# Patient Record
Sex: MALE | Race: BLACK OR AFRICAN AMERICAN | NOT HISPANIC OR LATINO | URBAN - METROPOLITAN AREA
[De-identification: names, ages, dates, MRNs, and addresses within clinical notes are randomized per-mention and may not be internally consistent; named-entity substitution may affect disease eponyms.]

---

## 2021-09-11 ENCOUNTER — INPATIENT (INPATIENT)
Facility: HOSPITAL | Age: 41
LOS: 3 days | Discharge: HOME | End: 2021-09-15
Attending: GENERAL ACUTE CARE HOSPITAL | Admitting: GENERAL ACUTE CARE HOSPITAL
Payer: MEDICAID

## 2021-09-11 VITALS
RESPIRATION RATE: 16 BRPM | SYSTOLIC BLOOD PRESSURE: 138 MMHG | HEART RATE: 88 BPM | TEMPERATURE: 99 F | DIASTOLIC BLOOD PRESSURE: 92 MMHG | OXYGEN SATURATION: 98 % | HEIGHT: 73 IN | WEIGHT: 194.01 LBS

## 2021-09-11 LAB
ACETONE SERPL-MCNC: ABNORMAL
ALBUMIN SERPL ELPH-MCNC: 4.2 G/DL — SIGNIFICANT CHANGE UP (ref 3.3–5)
ALP SERPL-CCNC: 85 U/L — SIGNIFICANT CHANGE UP (ref 40–120)
ALT FLD-CCNC: 20 U/L — SIGNIFICANT CHANGE UP (ref 12–78)
ANION GAP SERPL CALC-SCNC: 13 MMOL/L — SIGNIFICANT CHANGE UP (ref 5–17)
AST SERPL-CCNC: 18 U/L — SIGNIFICANT CHANGE UP (ref 15–37)
BASOPHILS # BLD AUTO: 0.04 K/UL — SIGNIFICANT CHANGE UP (ref 0–0.2)
BASOPHILS NFR BLD AUTO: 0.3 % — SIGNIFICANT CHANGE UP (ref 0–2)
BILIRUB SERPL-MCNC: 1.5 MG/DL — HIGH (ref 0.2–1.2)
BUN SERPL-MCNC: 28 MG/DL — HIGH (ref 7–23)
CALCIUM SERPL-MCNC: 10 MG/DL — SIGNIFICANT CHANGE UP (ref 8.5–10.1)
CHLORIDE SERPL-SCNC: 100 MMOL/L — SIGNIFICANT CHANGE UP (ref 96–108)
CO2 SERPL-SCNC: 21 MMOL/L — LOW (ref 22–31)
CREAT SERPL-MCNC: 1.55 MG/DL — HIGH (ref 0.5–1.3)
EOSINOPHIL # BLD AUTO: 0 K/UL — SIGNIFICANT CHANGE UP (ref 0–0.5)
EOSINOPHIL NFR BLD AUTO: 0 % — SIGNIFICANT CHANGE UP (ref 0–6)
GLUCOSE BLDC GLUCOMTR-MCNC: 154 MG/DL — HIGH (ref 70–99)
GLUCOSE BLDC GLUCOMTR-MCNC: 255 MG/DL — HIGH (ref 70–99)
GLUCOSE SERPL-MCNC: 229 MG/DL — HIGH (ref 70–99)
HCT VFR BLD CALC: 44.6 % — SIGNIFICANT CHANGE UP (ref 39–50)
HGB BLD-MCNC: 15.7 G/DL — SIGNIFICANT CHANGE UP (ref 13–17)
IMM GRANULOCYTES NFR BLD AUTO: 0.3 % — SIGNIFICANT CHANGE UP (ref 0–1.5)
LIDOCAIN IGE QN: 61 U/L — LOW (ref 73–393)
LYMPHOCYTES # BLD AUTO: 1.46 K/UL — SIGNIFICANT CHANGE UP (ref 1–3.3)
LYMPHOCYTES # BLD AUTO: 12.1 % — LOW (ref 13–44)
MCHC RBC-ENTMCNC: 29.3 PG — SIGNIFICANT CHANGE UP (ref 27–34)
MCHC RBC-ENTMCNC: 35.2 GM/DL — SIGNIFICANT CHANGE UP (ref 32–36)
MCV RBC AUTO: 83.2 FL — SIGNIFICANT CHANGE UP (ref 80–100)
MONOCYTES # BLD AUTO: 0.97 K/UL — HIGH (ref 0–0.9)
MONOCYTES NFR BLD AUTO: 8 % — SIGNIFICANT CHANGE UP (ref 2–14)
NEUTROPHILS # BLD AUTO: 9.56 K/UL — HIGH (ref 1.8–7.4)
NEUTROPHILS NFR BLD AUTO: 79.3 % — HIGH (ref 43–77)
NRBC # BLD: 0 /100 WBCS — SIGNIFICANT CHANGE UP (ref 0–0)
PLATELET # BLD AUTO: 215 K/UL — SIGNIFICANT CHANGE UP (ref 150–400)
POTASSIUM SERPL-MCNC: 4.1 MMOL/L — SIGNIFICANT CHANGE UP (ref 3.5–5.3)
POTASSIUM SERPL-SCNC: 4.1 MMOL/L — SIGNIFICANT CHANGE UP (ref 3.5–5.3)
PROT SERPL-MCNC: 8.8 GM/DL — HIGH (ref 6–8.3)
RBC # BLD: 5.36 M/UL — SIGNIFICANT CHANGE UP (ref 4.2–5.8)
RBC # FLD: 13.3 % — SIGNIFICANT CHANGE UP (ref 10.3–14.5)
SODIUM SERPL-SCNC: 134 MMOL/L — LOW (ref 135–145)
WBC # BLD: 12.07 K/UL — HIGH (ref 3.8–10.5)
WBC # FLD AUTO: 12.07 K/UL — HIGH (ref 3.8–10.5)

## 2021-09-11 PROCEDURE — 93010 ELECTROCARDIOGRAM REPORT: CPT

## 2021-09-11 PROCEDURE — 99285 EMERGENCY DEPT VISIT HI MDM: CPT

## 2021-09-11 PROCEDURE — 99223 1ST HOSP IP/OBS HIGH 75: CPT

## 2021-09-11 PROCEDURE — 74177 CT ABD & PELVIS W/CONTRAST: CPT | Mod: 26,MH

## 2021-09-11 RX ORDER — SUCRALFATE 1 G
1 TABLET ORAL
Qty: 56 | Refills: 0
Start: 2021-09-11 | End: 2021-09-24

## 2021-09-11 RX ORDER — ONDANSETRON 8 MG/1
4 TABLET, FILM COATED ORAL EVERY 6 HOURS
Refills: 0 | Status: DISCONTINUED | OUTPATIENT
Start: 2021-09-11 | End: 2021-09-15

## 2021-09-11 RX ORDER — LIDOCAINE 4 G/100G
10 CREAM TOPICAL ONCE
Refills: 0 | Status: COMPLETED | OUTPATIENT
Start: 2021-09-11 | End: 2021-09-11

## 2021-09-11 RX ORDER — ONDANSETRON 8 MG/1
8 TABLET, FILM COATED ORAL ONCE
Refills: 0 | Status: COMPLETED | OUTPATIENT
Start: 2021-09-11 | End: 2021-09-11

## 2021-09-11 RX ORDER — MORPHINE SULFATE 50 MG/1
4 CAPSULE, EXTENDED RELEASE ORAL ONCE
Refills: 0 | Status: DISCONTINUED | OUTPATIENT
Start: 2021-09-11 | End: 2021-09-11

## 2021-09-11 RX ORDER — SUCRALFATE 1 G
1 TABLET ORAL ONCE
Refills: 0 | Status: DISCONTINUED | OUTPATIENT
Start: 2021-09-11 | End: 2021-09-11

## 2021-09-11 RX ORDER — SODIUM CHLORIDE 9 MG/ML
2000 INJECTION INTRAMUSCULAR; INTRAVENOUS; SUBCUTANEOUS ONCE
Refills: 0 | Status: COMPLETED | OUTPATIENT
Start: 2021-09-11 | End: 2021-09-11

## 2021-09-11 RX ORDER — MORPHINE SULFATE 50 MG/1
2 CAPSULE, EXTENDED RELEASE ORAL ONCE
Refills: 0 | Status: DISCONTINUED | OUTPATIENT
Start: 2021-09-11 | End: 2021-09-11

## 2021-09-11 RX ORDER — FAMOTIDINE 10 MG/ML
1 INJECTION INTRAVENOUS
Qty: 28 | Refills: 0
Start: 2021-09-11 | End: 2021-09-24

## 2021-09-11 RX ORDER — METOCLOPRAMIDE HCL 10 MG
1 TABLET ORAL
Qty: 56 | Refills: 0
Start: 2021-09-11 | End: 2021-09-24

## 2021-09-11 RX ORDER — FAMOTIDINE 10 MG/ML
20 INJECTION INTRAVENOUS ONCE
Refills: 0 | Status: COMPLETED | OUTPATIENT
Start: 2021-09-11 | End: 2021-09-11

## 2021-09-11 RX ORDER — SODIUM CHLORIDE 9 MG/ML
1000 INJECTION, SOLUTION INTRAVENOUS
Refills: 0 | Status: DISCONTINUED | OUTPATIENT
Start: 2021-09-11 | End: 2021-09-14

## 2021-09-11 RX ORDER — METOCLOPRAMIDE HCL 10 MG
10 TABLET ORAL THREE TIMES A DAY
Refills: 0 | Status: DISCONTINUED | OUTPATIENT
Start: 2021-09-11 | End: 2021-09-13

## 2021-09-11 RX ADMIN — MORPHINE SULFATE 2 MILLIGRAM(S): 50 CAPSULE, EXTENDED RELEASE ORAL at 23:52

## 2021-09-11 RX ADMIN — ONDANSETRON 8 MILLIGRAM(S): 8 TABLET, FILM COATED ORAL at 09:37

## 2021-09-11 RX ADMIN — MORPHINE SULFATE 4 MILLIGRAM(S): 50 CAPSULE, EXTENDED RELEASE ORAL at 17:51

## 2021-09-11 RX ADMIN — MORPHINE SULFATE 2 MILLIGRAM(S): 50 CAPSULE, EXTENDED RELEASE ORAL at 22:34

## 2021-09-11 RX ADMIN — ONDANSETRON 8 MILLIGRAM(S): 8 TABLET, FILM COATED ORAL at 17:56

## 2021-09-11 RX ADMIN — MORPHINE SULFATE 4 MILLIGRAM(S): 50 CAPSULE, EXTENDED RELEASE ORAL at 11:39

## 2021-09-11 RX ADMIN — SODIUM CHLORIDE 2000 MILLILITER(S): 9 INJECTION INTRAMUSCULAR; INTRAVENOUS; SUBCUTANEOUS at 09:37

## 2021-09-11 RX ADMIN — LIDOCAINE 10 MILLILITER(S): 4 CREAM TOPICAL at 19:29

## 2021-09-11 RX ADMIN — FAMOTIDINE 20 MILLIGRAM(S): 10 INJECTION INTRAVENOUS at 09:37

## 2021-09-11 RX ADMIN — Medication 30 MILLILITER(S): at 09:37

## 2021-09-11 RX ADMIN — Medication 10 MILLIGRAM(S): at 22:09

## 2021-09-11 RX ADMIN — SODIUM CHLORIDE 125 MILLILITER(S): 9 INJECTION, SOLUTION INTRAVENOUS at 19:41

## 2021-09-11 NOTE — H&P ADULT - NSHPLABSRESULTS_GEN_ALL_CORE
LABS:                        15.7   12.07 )-----------( 215      ( 11 Sep 2021 09:38 )             44.6     09-11    134<L>  |  100  |  28<H>  ----------------------------<  229<H>  4.1   |  21<L>  |  1.55<H>    Ca    10.0      11 Sep 2021 09:38    TPro  8.8<H>  /  Alb  4.2  /  TBili  1.5<H>  /  DBili  x   /  AST  18  /  ALT  20  /  AlkPhos  85  09-11            RADIOLOGY & ADDITIONAL TESTS:

## 2021-09-11 NOTE — ED ADULT NURSE NOTE - NSIMPLEMENTINTERV_GEN_ALL_ED
Implemented All Fall with Harm Risk Interventions:  [place] to call system. Call bell, personal items and telephone within reach. Instruct patient to call for assistance. Room bathroom lighting operational. Non-slip footwear when patient is off stretcher. Physically safe environment: no spills, clutter or unnecessary equipment. Stretcher in lowest position, wheels locked, appropriate side rails in place. Provide visual cue, wrist band, yellow gown, etc. Monitor gait and stability. Monitor for mental status changes and reorient to person, place, and time. Review medications for side effects contributing to fall risk. Reinforce activity limits and safety measures with patient and family. Provide visual clues: red socks.

## 2021-09-11 NOTE — ED PROVIDER NOTE - CLINICAL SUMMARY MEDICAL DECISION MAKING FREE TEXT BOX
Pt well appearing, sleeping comfortably, tolerated oral intake. dc with meds. Pt well appearing, sleeping comfortably, tolerated oral intake. dc with meds.    pt was to be dc, however started to vomit after dc. will admit or intractable vomiting and pain. likely gastroparesiss/gastritis

## 2021-09-11 NOTE — ED PROVIDER NOTE - PHYSICAL EXAMINATION
Gen: Alert, Well appearing. NAD  , + dry retching  Head: NC, AT, PERRL, normal lids/conjunctiva   ENT: patent oropharynx without erythema/exudate, uvula midline  Neck: supple, no tenderness/meningismus  Pulm: Bilateral clear BS, normal resp effort  CV: RRR, no M/R/G, +dist pulses   Abd: soft, + epigastric tender, ND, +BS, no guarding/rebound tenderness  Mskel: no edema/erythema/cyanosis   Skin: no rash, no bruising  Neuro: AAOx3, no sensory/motor deficits, CN 2-12 intact

## 2021-09-11 NOTE — ED PROVIDER NOTE - CARE PLAN
Principal Discharge DX:	Gastritis   1 Principal Discharge DX:	Gastritis  Secondary Diagnosis:	Gastroparesis  Secondary Diagnosis:	Intractable abdominal pain

## 2021-09-11 NOTE — H&P ADULT - NSHPPHYSICALEXAM_GEN_ALL_CORE
PHYSICAL EXAMINATION:  Vital Signs Last 24 Hrs  T(C): 37.1 (11 Sep 2021 08:22), Max: 37.1 (11 Sep 2021 08:22)  T(F): 98.7 (11 Sep 2021 08:22), Max: 98.7 (11 Sep 2021 08:22)  HR: 88 (11 Sep 2021 08:22) (88 - 88)  BP: 138/92 (11 Sep 2021 08:22) (138/92 - 138/92)  BP(mean): --  RR: 16 (11 Sep 2021 08:22) (16 - 16)  SpO2: 98% (11 Sep 2021 08:22) (98% - 98%)  CAPILLARY BLOOD GLUCOSE      POCT Blood Glucose.: 255 mg/dL (11 Sep 2021 08:25)      GENERAL: NAD, well-groomed, well-developed  HEAD:  atraumatic, normocephalic  EYES: sclera anicteric  ENMT: mucous membranes moist  NECK: supple, No JVD  CHEST/LUNG: clear to auscultation bilaterally; no rales, rhonchi, or wheezing b/l  HEART: normal S1, S2  ABDOMEN: BS+, soft, ND, NT   EXTREMITIES:  pulses palpable; no clubbing, cyanosis, or edema b/l LEs  NEURO: awake, alert, interactive; moves all extremities  SKIN: no rashes or lesions PHYSICAL EXAMINATION:  Vital Signs Last 24 Hrs  T(C): 37.1 (11 Sep 2021 08:22), Max: 37.1 (11 Sep 2021 08:22)  T(F): 98.7 (11 Sep 2021 08:22), Max: 98.7 (11 Sep 2021 08:22)  HR: 88 (11 Sep 2021 08:22) (88 - 88)  BP: 138/92 (11 Sep 2021 08:22) (138/92 - 138/92)  BP(mean): --  RR: 16 (11 Sep 2021 08:22) (16 - 16)  SpO2: 98% (11 Sep 2021 08:22) (98% - 98%)  CAPILLARY BLOOD GLUCOSE      POCT Blood Glucose.: 255 mg/dL (11 Sep 2021 08:25)      GENERAL: NAD, seen in ER, comfortable, no fevers or SOB, no abdominal pain  HEAD:  atraumatic, normocephalic  EYES: sclera anicteric  ENMT: mucous membranes moist  NECK: supple, No JVD  CHEST/LUNG: clear to auscultation bilaterally; no rales, rhonchi, or wheezing b/l  HEART: normal S1, S2  ABDOMEN: BS+, soft, ND, NT   EXTREMITIES:  pulses palpable; no clubbing, cyanosis, or edema b/l LEs  NEURO: awake, alert, interactive; moves all extremities  SKIN: no rashes or lesions

## 2021-09-11 NOTE — ED ADULT NURSE NOTE - CHIEF COMPLAINT QUOTE
pt is having abdominal ,  epigastric and  vomiting for two days and getting worse  . history of DM, asthma .

## 2021-09-11 NOTE — ED ADULT NURSE NOTE - OBJECTIVE STATEMENT
Patient received in bed 15, A&Ox4, diaphoretic and anxious. PMHx DM.  Pt c/o generalized abdominal pain since yesterday. States prior endoscopy with unknown results. Denies being on medications for DM. Admits to marijuana usage. Denies chest pain, sob and headaches.

## 2021-09-11 NOTE — H&P ADULT - ASSESSMENT
42 yo male PMH DM presents with epigastric pain and N/V since yesterday. Pt reports this happens to him about every 90 days and had endoscopy but does not know what they found. Last EGD was done one year ago,not sure of Doctor's name. Not on any meds except for DM.  Admits to marijuana use, but denies daily use. CT abdomen entirely normal.     Plan: Admit to medicine, IVF LR at 125/h, IV Zofran prn and IV Reglan every 8 hours. Advance diet as tolerated. Urine tox pending.   Need to r/o THC as cause. Consider EGD Monday with GI if symptoms persist.     DM: Hold Metformen in hospital. A1C in AM.    40 yo male PMH DM presents with epigastric pain and N/V since yesterday. Pt reports this happens to him about every 90 days and had endoscopy but does not know what they found. Last EGD was done one year ago,not sure of Doctor's name. Not on any meds except for DM.  Admits to marijuana use, but denies daily use. CT abdomen entirely normal.     Plan: Admit to medicine, IVF LR at 125/h, IV Zofran prn and IV Reglan every 8 hours. Advance diet as tolerated. Urine tox pending.   Need to r/o THC as cause vs diabetic gastroporesis. Consider EGD Monday with GI if symptoms persist.     DM: Hold Metformen in hospital. A1C in AM.

## 2021-09-11 NOTE — ED ADULT NURSE NOTE - ED STAT RN HANDOFF DETAILS
Report endorsed to oncrichard Banks ED RN. Safety checks compld this shift/Safety rounds completed hourly.  IV sites checked Q2+remains WDL. Meds given as ord with no s/s of adverse RXNs. Fall +skin precs in place. Any issues endorsed to oncrichard RN for follow up.

## 2021-09-11 NOTE — ED PROVIDER NOTE - PATIENT PORTAL LINK FT
You can access the FollowMyHealth Patient Portal offered by [facility] by registering at the following website: [email or web address]. By joining [employer]’s FollowMyHealth portal, you will also be able to view your health information using other applications (apps) compatible with our system.

## 2021-09-12 LAB
A1C WITH ESTIMATED AVERAGE GLUCOSE RESULT: 11.3 % — HIGH (ref 4–5.6)
AMPHET UR-MCNC: NEGATIVE — SIGNIFICANT CHANGE UP
ANION GAP SERPL CALC-SCNC: 5 MMOL/L — SIGNIFICANT CHANGE UP (ref 5–17)
APPEARANCE UR: CLEAR — SIGNIFICANT CHANGE UP
BACTERIA # UR AUTO: ABNORMAL
BARBITURATES UR SCN-MCNC: NEGATIVE — SIGNIFICANT CHANGE UP
BENZODIAZ UR-MCNC: NEGATIVE — SIGNIFICANT CHANGE UP
BILIRUB UR-MCNC: NEGATIVE — SIGNIFICANT CHANGE UP
BUN SERPL-MCNC: 25 MG/DL — HIGH (ref 7–23)
CALCIUM SERPL-MCNC: 8.9 MG/DL — SIGNIFICANT CHANGE UP (ref 8.5–10.1)
CHLORIDE SERPL-SCNC: 101 MMOL/L — SIGNIFICANT CHANGE UP (ref 96–108)
CO2 SERPL-SCNC: 27 MMOL/L — SIGNIFICANT CHANGE UP (ref 22–31)
COCAINE METAB.OTHER UR-MCNC: NEGATIVE — SIGNIFICANT CHANGE UP
COLOR SPEC: YELLOW — SIGNIFICANT CHANGE UP
COVID-19 SPIKE DOMAIN AB INTERP: NEGATIVE — SIGNIFICANT CHANGE UP
COVID-19 SPIKE DOMAIN ANTIBODY RESULT: 0.4 U/ML — SIGNIFICANT CHANGE UP
CREAT SERPL-MCNC: 1.27 MG/DL — SIGNIFICANT CHANGE UP (ref 0.5–1.3)
DIFF PNL FLD: ABNORMAL
ESTIMATED AVERAGE GLUCOSE: 278 MG/DL — HIGH (ref 68–114)
FLUAV AG NPH QL: SIGNIFICANT CHANGE UP
FLUBV AG NPH QL: SIGNIFICANT CHANGE UP
GLUCOSE BLDC GLUCOMTR-MCNC: 140 MG/DL — HIGH (ref 70–99)
GLUCOSE BLDC GLUCOMTR-MCNC: 152 MG/DL — HIGH (ref 70–99)
GLUCOSE BLDC GLUCOMTR-MCNC: 168 MG/DL — HIGH (ref 70–99)
GLUCOSE BLDC GLUCOMTR-MCNC: 197 MG/DL — HIGH (ref 70–99)
GLUCOSE SERPL-MCNC: 165 MG/DL — HIGH (ref 70–99)
GLUCOSE UR QL: 250 MG/DL
HCT VFR BLD CALC: 43.5 % — SIGNIFICANT CHANGE UP (ref 39–50)
HGB BLD-MCNC: 14.4 G/DL — SIGNIFICANT CHANGE UP (ref 13–17)
KETONES UR-MCNC: ABNORMAL
LEUKOCYTE ESTERASE UR-ACNC: NEGATIVE — SIGNIFICANT CHANGE UP
MCHC RBC-ENTMCNC: 28.7 PG — SIGNIFICANT CHANGE UP (ref 27–34)
MCHC RBC-ENTMCNC: 33.1 GM/DL — SIGNIFICANT CHANGE UP (ref 32–36)
MCV RBC AUTO: 86.7 FL — SIGNIFICANT CHANGE UP (ref 80–100)
METHADONE UR-MCNC: NEGATIVE — SIGNIFICANT CHANGE UP
NITRITE UR-MCNC: NEGATIVE — SIGNIFICANT CHANGE UP
NRBC # BLD: 0 /100 WBCS — SIGNIFICANT CHANGE UP (ref 0–0)
OPIATES UR-MCNC: POSITIVE — SIGNIFICANT CHANGE UP
PCP SPEC-MCNC: SIGNIFICANT CHANGE UP
PCP UR-MCNC: NEGATIVE — SIGNIFICANT CHANGE UP
PH UR: 5 — SIGNIFICANT CHANGE UP (ref 5–8)
PLATELET # BLD AUTO: 179 K/UL — SIGNIFICANT CHANGE UP (ref 150–400)
POTASSIUM SERPL-MCNC: 4.2 MMOL/L — SIGNIFICANT CHANGE UP (ref 3.5–5.3)
POTASSIUM SERPL-SCNC: 4.2 MMOL/L — SIGNIFICANT CHANGE UP (ref 3.5–5.3)
PROT UR-MCNC: 100 MG/DL
RBC # BLD: 5.02 M/UL — SIGNIFICANT CHANGE UP (ref 4.2–5.8)
RBC # FLD: 13.5 % — SIGNIFICANT CHANGE UP (ref 10.3–14.5)
RBC CASTS # UR COMP ASSIST: ABNORMAL /HPF (ref 0–4)
SARS-COV-2 IGG+IGM SERPL QL IA: 0.4 U/ML — SIGNIFICANT CHANGE UP
SARS-COV-2 IGG+IGM SERPL QL IA: NEGATIVE — SIGNIFICANT CHANGE UP
SARS-COV-2 RNA SPEC QL NAA+PROBE: SIGNIFICANT CHANGE UP
SODIUM SERPL-SCNC: 133 MMOL/L — LOW (ref 135–145)
SP GR SPEC: 1.02 — SIGNIFICANT CHANGE UP (ref 1.01–1.02)
THC UR QL: POSITIVE — SIGNIFICANT CHANGE UP
UROBILINOGEN FLD QL: NEGATIVE MG/DL — SIGNIFICANT CHANGE UP
WBC # BLD: 9.17 K/UL — SIGNIFICANT CHANGE UP (ref 3.8–10.5)
WBC # FLD AUTO: 9.17 K/UL — SIGNIFICANT CHANGE UP (ref 3.8–10.5)
WBC UR QL: SIGNIFICANT CHANGE UP

## 2021-09-12 PROCEDURE — 99232 SBSQ HOSP IP/OBS MODERATE 35: CPT

## 2021-09-12 RX ORDER — PANTOPRAZOLE SODIUM 20 MG/1
40 TABLET, DELAYED RELEASE ORAL
Refills: 0 | Status: DISCONTINUED | OUTPATIENT
Start: 2021-09-12 | End: 2021-09-15

## 2021-09-12 RX ORDER — MORPHINE SULFATE 50 MG/1
2 CAPSULE, EXTENDED RELEASE ORAL EVERY 4 HOURS
Refills: 0 | Status: DISCONTINUED | OUTPATIENT
Start: 2021-09-12 | End: 2021-09-12

## 2021-09-12 RX ORDER — OXYCODONE HYDROCHLORIDE 5 MG/1
5 TABLET ORAL ONCE
Refills: 0 | Status: DISCONTINUED | OUTPATIENT
Start: 2021-09-12 | End: 2021-09-12

## 2021-09-12 RX ORDER — MORPHINE SULFATE 50 MG/1
2 CAPSULE, EXTENDED RELEASE ORAL EVERY 4 HOURS
Refills: 0 | Status: DISCONTINUED | OUTPATIENT
Start: 2021-09-12 | End: 2021-09-15

## 2021-09-12 RX ADMIN — MORPHINE SULFATE 2 MILLIGRAM(S): 50 CAPSULE, EXTENDED RELEASE ORAL at 19:48

## 2021-09-12 RX ADMIN — OXYCODONE HYDROCHLORIDE 5 MILLIGRAM(S): 5 TABLET ORAL at 03:29

## 2021-09-12 RX ADMIN — MORPHINE SULFATE 2 MILLIGRAM(S): 50 CAPSULE, EXTENDED RELEASE ORAL at 07:00

## 2021-09-12 RX ADMIN — OXYCODONE HYDROCHLORIDE 5 MILLIGRAM(S): 5 TABLET ORAL at 03:59

## 2021-09-12 RX ADMIN — MORPHINE SULFATE 2 MILLIGRAM(S): 50 CAPSULE, EXTENDED RELEASE ORAL at 05:43

## 2021-09-12 RX ADMIN — MORPHINE SULFATE 2 MILLIGRAM(S): 50 CAPSULE, EXTENDED RELEASE ORAL at 20:03

## 2021-09-12 RX ADMIN — Medication 10 MILLIGRAM(S): at 14:23

## 2021-09-12 RX ADMIN — MORPHINE SULFATE 2 MILLIGRAM(S): 50 CAPSULE, EXTENDED RELEASE ORAL at 10:45

## 2021-09-12 RX ADMIN — Medication 10 MILLIGRAM(S): at 05:43

## 2021-09-12 RX ADMIN — MORPHINE SULFATE 2 MILLIGRAM(S): 50 CAPSULE, EXTENDED RELEASE ORAL at 00:44

## 2021-09-12 RX ADMIN — MORPHINE SULFATE 2 MILLIGRAM(S): 50 CAPSULE, EXTENDED RELEASE ORAL at 01:34

## 2021-09-12 RX ADMIN — MORPHINE SULFATE 2 MILLIGRAM(S): 50 CAPSULE, EXTENDED RELEASE ORAL at 10:27

## 2021-09-12 NOTE — PROGRESS NOTE ADULT - SUBJECTIVE AND OBJECTIVE BOX
Patient is a 41y old  Male who presents with a chief complaint of Nausea and vomiting (11 Sep 2021 18:44)    INTERVAL HPI/OVERNIGHT EVENTS: no events, still w pain     MEDICATIONS  (STANDING):  lactated ringers. 1000 milliLiter(s) (125 mL/Hr) IV Continuous <Continuous>  metoclopramide Injectable 10 milliGRAM(s) IV Push three times a day  pantoprazole    Tablet 40 milliGRAM(s) Oral before breakfast    MEDICATIONS  (PRN):  morphine  - Injectable 2 milliGRAM(s) IV Push every 4 hours PRN Moderate Pain (4 - 6)  ondansetron Injectable 4 milliGRAM(s) IV Push every 6 hours PRN Nausea and/or Vomiting    Allergies    No Known Allergies    Intolerances      REVIEW OF SYSTEMS:  All other systems reviewed and are negative    Vital Signs Last 24 Hrs  T(C): 37.3 (12 Sep 2021 11:48), Max: 37.3 (12 Sep 2021 11:48)  T(F): 99.2 (12 Sep 2021 11:48), Max: 99.2 (12 Sep 2021 11:48)  HR: 67 (12 Sep 2021 11:48) (61 - 76)  BP: 134/91 (12 Sep 2021 11:48) (134/91 - 154/93)  BP(mean): --  RR: 18 (12 Sep 2021 11:48) (16 - 18)  SpO2: 100% (12 Sep 2021 11:48) (99% - 100%)  Daily     Daily   I&O's Summary    CAPILLARY BLOOD GLUCOSE      POCT Blood Glucose.: 168 mg/dL (12 Sep 2021 10:54)  POCT Blood Glucose.: 140 mg/dL (12 Sep 2021 07:51)  POCT Blood Glucose.: 154 mg/dL (11 Sep 2021 22:38)    PHYSICAL EXAM:  GENERAL: NAD,    HEAD:  Atraumatic, Normocephalic  EYES: EOMI, PERRLA, conjunctiva and sclera clear  ENMT: No tonsillar erythema, exudates, or enlargement; Moist mucous membranes, Good dentition, No lesions  NECK: Supple, No JVD, Normal thyroid  NERVOUS SYSTEM:  Alert & Oriented X3, Good concentration; Motor Strength 5/5 B/L upper and lower extremities; DTRs 2+ intact and symmetric  CHEST/LUNG: Clear to percussion bilaterally; No rales, rhonchi, wheezing, or rubs  HEART: Regular rate and rhythm; No murmurs, rubs, or gallops  ABDOMEN: Soft, Nontender, Nondistended; Bowel sounds present  EXTREMITIES:  2+ Peripheral Pulses, No clubbing, cyanosis, or edema  LYMPH: No lymphadenopathy noted  SKIN: No rashes or lesions    Labs                          14.4   9.17  )-----------( 179      ( 12 Sep 2021 07:18 )             43.5     09-12    133<L>  |  101  |  25<H>  ----------------------------<  165<H>  4.2   |  27  |  1.27    Ca    8.9      12 Sep 2021 07:18    TPro  8.8<H>  /  Alb  4.2  /  TBili  1.5<H>  /  DBili  x   /  AST  18  /  ALT  20  /  AlkPhos  85  09-11          Urinalysis Basic - ( 12 Sep 2021 05:56 )    Color: Yellow / Appearance: Clear / S.020 / pH: x  Gluc: x / Ketone: Moderate  / Bili: Negative / Urobili: Negative mg/dL   Blood: x / Protein: 100 mg/dL / Nitrite: Negative   Leuk Esterase: Negative / RBC: 3-5 /HPF / WBC 0-2   Sq Epi: x / Non Sq Epi: x / Bacteria: Occasional                  DVT prophylaxis: > Lovenox 40mg SQ daily  > Heparin   > SCD's

## 2021-09-12 NOTE — PROGRESS NOTE ADULT - ASSESSMENT
40 yo male PMH DM presents with epigastric pain and N/V since yesterday. Pt reports this happens to him about every 90 days and had endoscopy but does not know what they found. Last EGD was done one year ago,not sure of Doctor's name. Not on any meds except for DM.  Admits to marijuana use, but denies daily use. CT abdomen entirely normal.     Plan: A , IVF LR at 125/h, IV Zofran prn and IV Reglan every 8 hours. Advance diet as tolerated.    Need to r/o THC as cause vs diabetic gastroporesis.  recent egd wnl, pt likely w gastroparesis 2/2 uncontrolled diabetes  Does not want to take insulin     DM: Hold Metformen in hospital. A1C in AM.

## 2021-09-13 LAB
CULTURE RESULTS: SIGNIFICANT CHANGE UP
GLUCOSE BLDC GLUCOMTR-MCNC: 158 MG/DL — HIGH (ref 70–99)
GLUCOSE BLDC GLUCOMTR-MCNC: 165 MG/DL — HIGH (ref 70–99)
GLUCOSE BLDC GLUCOMTR-MCNC: 196 MG/DL — HIGH (ref 70–99)
GLUCOSE BLDC GLUCOMTR-MCNC: 225 MG/DL — HIGH (ref 70–99)
SPECIMEN SOURCE: SIGNIFICANT CHANGE UP

## 2021-09-13 PROCEDURE — 99232 SBSQ HOSP IP/OBS MODERATE 35: CPT

## 2021-09-13 RX ORDER — METOCLOPRAMIDE HCL 10 MG
10 TABLET ORAL THREE TIMES A DAY
Refills: 0 | Status: DISCONTINUED | OUTPATIENT
Start: 2021-09-13 | End: 2021-09-14

## 2021-09-13 RX ADMIN — Medication 10 MILLIGRAM(S): at 13:32

## 2021-09-13 RX ADMIN — PANTOPRAZOLE SODIUM 40 MILLIGRAM(S): 20 TABLET, DELAYED RELEASE ORAL at 06:14

## 2021-09-13 RX ADMIN — MORPHINE SULFATE 2 MILLIGRAM(S): 50 CAPSULE, EXTENDED RELEASE ORAL at 06:29

## 2021-09-13 RX ADMIN — MORPHINE SULFATE 2 MILLIGRAM(S): 50 CAPSULE, EXTENDED RELEASE ORAL at 06:14

## 2021-09-13 RX ADMIN — MORPHINE SULFATE 2 MILLIGRAM(S): 50 CAPSULE, EXTENDED RELEASE ORAL at 01:08

## 2021-09-13 RX ADMIN — MORPHINE SULFATE 2 MILLIGRAM(S): 50 CAPSULE, EXTENDED RELEASE ORAL at 00:53

## 2021-09-13 NOTE — PROGRESS NOTE ADULT - ASSESSMENT
40 yo male PMH DM presents with epigastric pain and N/V since yesterday. Pt reports this happens to him about every 90 days and had endoscopy but does not know what they found. Last EGD was done one year ago,not sure of Doctor's name. Not on any meds except for DM.  Admits to marijuana use, but denies daily use. CT abdomen entirely normal.     Plan: A , IVF LR at 125/h, IV Zofran prn and IV Reglan every 8 hours. Advance diet as tolerated.    Need to r/o THC as cause vs diabetic gastroporesis.  recent egd wnl, pt likely w gastroparesis 2/2 uncontrolled diabetes  Still w abd pain, gi to see patient     Does not want to take insulin     DM: Hold Metmervat in hospital. A1C 11.2

## 2021-09-13 NOTE — CONSULT NOTE ADULT - ASSESSMENT
HPI:  40 yo male PMH DM presents with epigastric pain and N/V since yesterday. Pt reports this happens to him about every 90 days and had endoscopy but does not know what they found. Last EGD was done one year ago,not sure of Doctor's name. Not on any meds except for DM.  Admits to marijuana use, but denies daily use. CT abdomen entirely normal.  (11 Sep 2021 18:44)    --- As Above -----------------  Patient states that since 09/2020, he has episodes of abdominal pain described as crampy a/w N/V. He had 4 episodes necessitating admission prior to September. He admits taking marijuana but infrequently drinks or takes NSAIDs. HGB A1c better at 11 ( seel lab ) Patient states that he had a negative EGD in september.  Feels better today    N/V, abdominal pain- R/O gastroparesis, secondary to marijuana use, GB disease, etc. - 1) Clear liquid diet 2) IV fluid ( Patient refusing. States that the IV is dehydrating him and that bottle water works better. ( When I tried to explain to him the physiology of the IV, he became more belligerent ( smiling with mask on / don't believe him )) 3) HIDA with CCK ( Must be off Morphine and Reglan - Patient refusing to be off Morphine until tomorrow ) 4) will hold off on EGD for now

## 2021-09-13 NOTE — PROGRESS NOTE ADULT - SUBJECTIVE AND OBJECTIVE BOX
Patient is a 41y old  Male who presents with a chief complaint of Nausea and vomiting (12 Sep 2021 11:56)    INTERVAL HPI/OVERNIGHT EVENTS: no events     MEDICATIONS  (STANDING):  lactated ringers. 1000 milliLiter(s) (125 mL/Hr) IV Continuous <Continuous>  metoclopramide Injectable 10 milliGRAM(s) IV Push three times a day  pantoprazole    Tablet 40 milliGRAM(s) Oral before breakfast    MEDICATIONS  (PRN):  morphine  - Injectable 2 milliGRAM(s) IV Push every 4 hours PRN Moderate Pain (4 - 6)  ondansetron Injectable 4 milliGRAM(s) IV Push every 6 hours PRN Nausea and/or Vomiting    Allergies    No Known Allergies    Intolerances      REVIEW OF SYSTEMS:  All other systems reviewed and are negative    Vital Signs Last 24 Hrs  T(C): 36.7 (13 Sep 2021 11:36), Max: 36.8 (13 Sep 2021 06:10)  T(F): 98.1 (13 Sep 2021 11:36), Max: 98.3 (13 Sep 2021 06:10)  HR: 62 (13 Sep 2021 11:36) (61 - 62)  BP: 135/76 (13 Sep 2021 11:36) (126/70 - 148/92)  BP(mean): --  RR: 17 (13 Sep 2021 11:36) (17 - 18)  SpO2: 98% (13 Sep 2021 11:36) (98% - 100%)  Daily     Daily   I&O's Summary    12 Sep 2021 07:01  -  13 Sep 2021 07:00  --------------------------------------------------------  IN: 1730 mL / OUT: 0 mL / NET: 1730 mL      CAPILLARY BLOOD GLUCOSE      POCT Blood Glucose.: 158 mg/dL (13 Sep 2021 11:05)  POCT Blood Glucose.: 165 mg/dL (13 Sep 2021 07:25)  POCT Blood Glucose.: 197 mg/dL (12 Sep 2021 22:00)  POCT Blood Glucose.: 152 mg/dL (12 Sep 2021 15:48)    PHYSICAL EXAM:  GENERAL: NAD,    HEAD:  Atraumatic, Normocephalic  EYES: EOMI, PERRLA, conjunctiva and sclera clear  ENMT: No tonsillar erythema, exudates, or enlargement; Moist mucous membranes, Good dentition, No lesions  NECK: Supple, No JVD, Normal thyroid  NERVOUS SYSTEM:  Alert & Oriented X3, Good concentration; Motor Strength 5/5 B/L upper and lower extremities; DTRs 2+ intact and symmetric  CHEST/LUNG: Clear to percussion bilaterally; No rales, rhonchi, wheezing, or rubs  HEART: Regular rate and rhythm; No murmurs, rubs, or gallops  ABDOMEN: Soft, Nontender, Nondistended; Bowel sounds present  EXTREMITIES:  2+ Peripheral Pulses, No clubbing, cyanosis, or edema  LYMPH: No lymphadenopathy noted  SKIN: No rashes or lesions    Labs                          14.4   9.17  )-----------( 179      ( 12 Sep 2021 07:18 )             43.5     09-12    133<L>  |  101  |  25<H>  ----------------------------<  165<H>  4.2   |  27  |  1.27    Ca    8.9      12 Sep 2021 07:18            Urinalysis Basic - ( 12 Sep 2021 05:56 )    Color: Yellow / Appearance: Clear / S.020 / pH: x  Gluc: x / Ketone: Moderate  / Bili: Negative / Urobili: Negative mg/dL   Blood: x / Protein: 100 mg/dL / Nitrite: Negative   Leuk Esterase: Negative / RBC: 3-5 /HPF / WBC 0-2   Sq Epi: x / Non Sq Epi: x / Bacteria: Occasional        Culture - Urine (collected 12 Sep 2021 10:59)  Source: Clean Catch Clean Catch (Midstream)  Final Report (13 Sep 2021 10:09):    <10,000 CFU/mL Normal Urogenital Celina                DVT prophylaxis: > Lovenox 40mg SQ daily  > Heparin   > SCD's

## 2021-09-13 NOTE — CONSULT NOTE ADULT - SUBJECTIVE AND OBJECTIVE BOX
HPI:  42 yo male PMH DM presents with epigastric pain and N/V since yesterday. Pt reports this happens to him about every 90 days and had endoscopy but does not know what they found. Last EGD was done one year ago,not sure of Doctor's name. Not on any meds except for DM.  Admits to marijuana use, but denies daily use. CT abdomen entirely normal.  (11 Sep 2021 18:44)    ------ As Above -----------------  Patient states that since 2020, he has episodes of abdominal pain ( described as crampy ) a/w N/V. He had 4 episodes necessitating admission prior to 2021. He admits taking marijuana but infrequently drinks or takes NSAIDs. HGB A1c better  ( seel lab ) Patient states that he had a negative EGD in september.  Feels better today.      MEDICATIONS  (STANDING):  lactated ringers. 1000 milliLiter(s) (125 mL/Hr) IV Continuous <Continuous>  metoclopramide Injectable 10 milliGRAM(s) IV Push three times a day  pantoprazole    Tablet 40 milliGRAM(s) Oral before breakfast    MEDICATIONS  (PRN):  morphine  - Injectable 2 milliGRAM(s) IV Push every 4 hours PRN Moderate Pain (4 - 6)  ondansetron Injectable 4 milliGRAM(s) IV Push every 6 hours PRN Nausea and/or Vomiting      Allergies    No Known Allergies    Intolerances        FAMILY HISTORY:      REVIEW OF SYSTEMS:    CONSTITUTIONAL: No fever, weight loss,   EYES: No eye pain, visual disturbances, or discharge  ENMT:  No difficulty hearing, tinnitus, vertigo; No sinus or throat pain  NECK: No pain or stiffness  BREASTS: No pain, masses, or nipple discharge  RESPIRATORY: No cough, wheezing, chills or hemoptysis; No shortness of breath  CARDIOVASCULAR: No chest pain, palpitations, dizziness, or leg swelling  GASTROINTESTINAL: See above   GENITOURINARY: No dysuria, frequency, hematuria, or incontinence  NEUROLOGICAL: No headaches, memory loss, loss of strength, numbness, or tremors  SKIN: No itching, burning, rashes, or lesions   LYMPH NODES: No enlarged glands  ENDOCRINE: No heat or cold intolerance; No hair loss  MUSCULOSKELETAL: No joint pain or swelling; No muscle, back, or extremity pain  PSYCHIATRIC: No depression, anxiety, mood swings, or difficulty sleeping  HEME/LYMPH: No easy bruising, or bleeding gums  ALLERGY AND IMMUNOLOGIC: No hives or eczema          SOCIAL HISTORY:    FAMILY HISTORY:      Vital Signs Last 24 Hrs  T(C): 36.7 (13 Sep 2021 11:36), Max: 36.8 (13 Sep 2021 06:10)  T(F): 98.1 (13 Sep 2021 11:36), Max: 98.3 (13 Sep 2021 06:10)  HR: 62 (13 Sep 2021 11:36) (61 - 62)  BP: 135/76 (13 Sep 2021 11:36) (126/70 - 148/92)  BP(mean): --  RR: 17 (13 Sep 2021 11:36) (17 - 18)  SpO2: 98% (13 Sep 2021 11:36) (98% - 100%)    PHYSICAL EXAM:    GENERAL: NAD, well-groomed, well-developed  HEAD:  Atraumatic, Normocephalic  EYES: EOMI, PERRLA, conjunctiva and sclera clear  NECK: Supple, No JVD, Normal thyroid  NERVOUS SYSTEM:  Alert & Oriented X3, Good concentration; Motor Strength 5/5 B/L upper and lower extremities;  CHEST/LUNG: Clear to percussion bilaterally; No rales, rhonchi, wheezing, or rubs  HEART: Regular rate and rhythm; No murmurs, rubs, or gallops  ABDOMEN: Soft, Nontender, Nondistended; Bowel sounds present  EXTREMITIES:  2+ Peripheral Pulses, No clubbing, cyanosis, or edema  LYMPH: No lymphadenopathy noted   RECTAL:  Deferred   SKIN: No rashes or lesions    LABS:                        14.4   9.17  )-----------( 179      ( 12 Sep 2021 07:18 )             43.5       CBC:   @ 07:18  WBC  9.17  HGB 14.4  HCT 43.5 Plate 179  MCV 86.7   @ 09:38  WBC  12.07  HGB 15.7  HCT 44.6 Plate 215  MCV 83.2           12 Sep 2021 07:18    133    |  101    |  25     ----------------------------<  165    4.2     |  27     |  1.27   11 Sep 2021 09:38    134    |  100    |  28     ----------------------------<  229    4.1     |  21     |  1.55     Ca    8.9        12 Sep 2021 07:18  Ca    10.0       11 Sep 2021 09:38    TPro  8.8    /  Alb  4.2    /  TBili  1.5    /  DBili  x      /  AST  18     /  ALT  20     /  AlkPhos  85     11 Sep 2021 09:38      Urinalysis Basic - ( 12 Sep 2021 05:56 )    Color: Yellow / Appearance: Clear / S.020 / pH: x  Gluc: x / Ketone: Moderate  / Bili: Negative / Urobili: Negative mg/dL   Blood: x / Protein: 100 mg/dL / Nitrite: Negative   Leuk Esterase: Negative / RBC: 3-5 /HPF / WBC 0-2   Sq Epi: x / Non Sq Epi: x / Bacteria: Occasional          RADIOLOGY & ADDITIONAL STUDIES:  < from: CT Abdomen and Pelvis w/ IV Cont (21 @ 12:03) >    EXAM:  CT ABDOMEN AND PELVIS IC                            PROCEDURE DATE:  2021          INTERPRETATION:  CLINICAL INFORMATION: Epigastric pain with nausea and vomiting.    COMPARISON: None.    CONTRAST/COMPLICATIONS:  IV Contrast: [id number]  99 cc administered   1 cc discarded  Oral Contrast: NONE  Complications: None reported at time of study completion    PROCEDURE:  CT of the Abdomen and Pelvis was performed.  Sagittal and coronal reformats were performed.    FINDINGS:    LOWER CHEST: Within normal limits.    LIVER: Within normal limits.  BILE DUCTS: Normal caliber.  GALLBLADDER: Within normal limits.  SPLEEN: Within normal limits.  PANCREAS: Within normal limits.  ADRENALS: Within normal limits.  KIDNEYS/URETERS: Within normal limits.    BLADDER: Within normal limits.  REPRODUCTIVE ORGANS: Prostate is not enlarged.    BOWEL:  Evaluation of the stomach is limited without distention.  No bowel obstruction.  Appendix normal.  PERITONEUM: No ascites.    VESSELS: Minimal atherosclerotic calcification.  RETROPERITONEUM/LYMPH NODES: No lymphadenopathy.    ABDOMINAL WALL: Within normal limits.    BONES: No acute osseous abnormality.    IMPRESSION:    No acute intra-abdominal pathology noted.        --- End of Report ---            IRAIS HARRISON MD; Attending Radiologist  This document has been electronically signed. Sep 11 2021 12:38PM    < end of copied text >

## 2021-09-14 LAB
GLUCOSE BLDC GLUCOMTR-MCNC: 163 MG/DL — HIGH (ref 70–99)
GLUCOSE BLDC GLUCOMTR-MCNC: 186 MG/DL — HIGH (ref 70–99)
GLUCOSE BLDC GLUCOMTR-MCNC: 187 MG/DL — HIGH (ref 70–99)

## 2021-09-14 PROCEDURE — 78227 HEPATOBIL SYST IMAGE W/DRUG: CPT | Mod: 26

## 2021-09-14 PROCEDURE — 99232 SBSQ HOSP IP/OBS MODERATE 35: CPT

## 2021-09-14 RX ORDER — SODIUM CHLORIDE 9 MG/ML
1000 INJECTION, SOLUTION INTRAVENOUS
Refills: 0 | Status: DISCONTINUED | OUTPATIENT
Start: 2021-09-14 | End: 2021-09-15

## 2021-09-14 RX ORDER — ACETAMINOPHEN 500 MG
1000 TABLET ORAL ONCE
Refills: 0 | Status: COMPLETED | OUTPATIENT
Start: 2021-09-14 | End: 2021-09-14

## 2021-09-14 RX ORDER — SINCALIDE 5 UG/5ML
1.8 INJECTION, POWDER, LYOPHILIZED, FOR SOLUTION INTRAVENOUS ONCE
Refills: 0 | Status: COMPLETED | OUTPATIENT
Start: 2021-09-14 | End: 2021-09-14

## 2021-09-14 RX ORDER — MORPHINE SULFATE 50 MG/1
2 CAPSULE, EXTENDED RELEASE ORAL ONCE
Refills: 0 | Status: DISCONTINUED | OUTPATIENT
Start: 2021-09-14 | End: 2021-09-14

## 2021-09-14 RX ORDER — METOCLOPRAMIDE HCL 10 MG
10 TABLET ORAL THREE TIMES A DAY
Refills: 0 | Status: DISCONTINUED | OUTPATIENT
Start: 2021-09-14 | End: 2021-09-15

## 2021-09-14 RX ADMIN — MORPHINE SULFATE 2 MILLIGRAM(S): 50 CAPSULE, EXTENDED RELEASE ORAL at 16:32

## 2021-09-14 RX ADMIN — Medication 10 MILLIGRAM(S): at 22:11

## 2021-09-14 RX ADMIN — SINCALIDE 51.8 MICROGRAM(S): 5 INJECTION, POWDER, LYOPHILIZED, FOR SOLUTION INTRAVENOUS at 14:16

## 2021-09-14 RX ADMIN — ONDANSETRON 4 MILLIGRAM(S): 8 TABLET, FILM COATED ORAL at 15:56

## 2021-09-14 RX ADMIN — Medication 400 MILLIGRAM(S): at 11:35

## 2021-09-14 RX ADMIN — MORPHINE SULFATE 2 MILLIGRAM(S): 50 CAPSULE, EXTENDED RELEASE ORAL at 18:32

## 2021-09-14 RX ADMIN — MORPHINE SULFATE 2 MILLIGRAM(S): 50 CAPSULE, EXTENDED RELEASE ORAL at 23:45

## 2021-09-14 RX ADMIN — SODIUM CHLORIDE 125 MILLILITER(S): 9 INJECTION, SOLUTION INTRAVENOUS at 16:02

## 2021-09-14 RX ADMIN — MORPHINE SULFATE 2 MILLIGRAM(S): 50 CAPSULE, EXTENDED RELEASE ORAL at 16:05

## 2021-09-14 RX ADMIN — MORPHINE SULFATE 2 MILLIGRAM(S): 50 CAPSULE, EXTENDED RELEASE ORAL at 23:33

## 2021-09-14 RX ADMIN — Medication 1000 MILLIGRAM(S): at 12:35

## 2021-09-14 RX ADMIN — PANTOPRAZOLE SODIUM 40 MILLIGRAM(S): 20 TABLET, DELAYED RELEASE ORAL at 07:52

## 2021-09-14 RX ADMIN — MORPHINE SULFATE 2 MILLIGRAM(S): 50 CAPSULE, EXTENDED RELEASE ORAL at 22:37

## 2021-09-14 RX ADMIN — MORPHINE SULFATE 2 MILLIGRAM(S): 50 CAPSULE, EXTENDED RELEASE ORAL at 15:52

## 2021-09-14 NOTE — PROGRESS NOTE ADULT - ASSESSMENT
42 yo male PMH DM presents with epigastric pain and N/V since yesterday. Pt reports this happens to him about every 90 days and had endoscopy but does not know what they found. Last EGD was done one year ago,not sure of Doctor's name. Not on any meds except for DM.  Admits to marijuana use, but denies daily use. CT abdomen entirely normal.     Plan: A , IVF LR at 125/h, IV Zofran prn and IV Reglan every 8 hours. Advance diet as tolerated.    Need to r/o THC as cause vs diabetic gastroporesis.  recent egd wnl, pt likely w gastroparesis 2/2 uncontrolled diabetes    Still w abd pain, gi on board  HIDA scan today     Does not want to take insulin     DM: Hold Metformen in hospital. A1C 11.2

## 2021-09-14 NOTE — PROGRESS NOTE ADULT - ASSESSMENT
HPI:  42 yo male PMH DM presents with epigastric pain and N/V since yesterday. Pt reports this happens to him about every 90 days and had endoscopy but does not know what they found. Last EGD was done one year ago,not sure of Doctor's name. Not on any meds except for DM.  Admits to marijuana use, but denies daily use. CT abdomen entirely normal.  (11 Sep 2021 18:44)    --- As Above -----------------  Patient states that since 09/2020, he has episodes of abdominal pain described as crampy a/w N/V. He had 4 episodes necessitating admission prior to September. He admits taking marijuana but infrequently drinks or takes NSAIDs. HGB A1c better at 11 ( seel lab ) Patient states that he had a negative EGD in september.  Feels better today    N/V, abdominal pain- R/O gastroparesis, secondary to marijuana use, GB disease, etc. - 1) Clear liquid diet 2) IV fluid ( Patient now agreeing to IV ( Discussed for 10 minutes ) 3) Check HIDA with CCK  4) will hold off on EGD for now 5) Reglan Q 8

## 2021-09-14 NOTE — PROGRESS NOTE ADULT - SUBJECTIVE AND OBJECTIVE BOX
Patient is a 41y old  Male who presents with a chief complaint of Nausea and vomiting (14 Sep 2021 12:30)      HPI:  42 yo male PMH DM presents with epigastric pain and N/V since yesterday. Pt reports this happens to him about every 90 days and had endoscopy but does not know what they found. Last EGD was done one year ago,not sure of Doctor's name. Not on any meds except for DM.  Admits to marijuana use, but denies daily use. CT abdomen entirely normal.  (11 Sep 2021 18:44)      INTERVAL HPI/OVERNIGHT EVENTS:      MEDICATIONS  (STANDING):  dextrose 5% + sodium chloride 0.45%. 1000 milliLiter(s) (125 mL/Hr) IV Continuous <Continuous>  metoclopramide Injectable 10 milliGRAM(s) IV Push three times a day  pantoprazole    Tablet 40 milliGRAM(s) Oral before breakfast    MEDICATIONS  (PRN):  metoclopramide Injectable 10 milliGRAM(s) IV Push three times a day PRN Nausea / vomiting  morphine  - Injectable 2 milliGRAM(s) IV Push every 4 hours PRN Moderate Pain (4 - 6)  ondansetron Injectable 4 milliGRAM(s) IV Push every 6 hours PRN Nausea and/or Vomiting      FAMILY HISTORY:      Allergies    No Known Allergies    Intolerances        PMH/PSH:        REVIEW OF SYSTEMS:  CONSTITUTIONAL: No fever, weight loss, or fatigue  EYES: No eye pain, visual disturbances, or discharge  ENMT:  No difficulty hearing, tinnitus, vertigo; No sinus or throat pain  NECK: No pain or stiffness  BREASTS: No pain, masses, or nipple discharge  RESPIRATORY: No cough, wheezing, chills or hemoptysis; No shortness of breath  CARDIOVASCULAR: No chest pain, palpitations, dizziness, or leg swelling  GASTROINTESTINAL: No abdominal or epigastric pain. No nausea, vomiting, or hematemesis; No diarrhea or constipation. No melena or hematochezia.  GENITOURINARY: No dysuria, frequency, hematuria, or incontinence  NEUROLOGICAL: No headaches, memory loss, loss of strength, numbness, or tremors  SKIN: No itching, burning, rashes, or lesions   LYMPH NODES: No enlarged glands  ENDOCRINE: No heat or cold intolerance; No hair loss  MUSCULOSKELETAL: No joint pain or swelling; No muscle, back, or extremity pain  PSYCHIATRIC: No depression, anxiety, mood swings, or difficulty sleeping  HEME/LYMPH: No easy bruising, or bleeding gums  ALLERGY AND IMMUNOLOGIC: No hives or eczema    Vital Signs Last 24 Hrs  T(C): 36.3 (14 Sep 2021 08:05), Max: 36.7 (13 Sep 2021 17:12)  T(F): 97.3 (14 Sep 2021 08:05), Max: 98 (13 Sep 2021 17:12)  HR: 56 (14 Sep 2021 08:05) (56 - 64)  BP: 136/85 (14 Sep 2021 08:05) (124/63 - 136/85)  BP(mean): --  RR: 16 (14 Sep 2021 08:05) (16 - 17)  SpO2: 98% (14 Sep 2021 08:05) (98% - 98%)    PHYSICAL EXAM:  GENERAL: NAD, well-groomed, well-developed  HEAD:  Atraumatic, Normocephalic  EYES: EOMI, PERRLA, conjunctiva and sclera clear  ENMT: No tonsillar erythema, exudates, or enlargement; Moist mucous membranes, Good dentition, No lesions  NECK: Supple, No JVD, Normal thyroid  NERVOUS SYSTEM:  Alert & Oriented X3, Good concentration; Motor Strength 5/5 B/L upper and lower extremities; DTRs 2+ intact and symmetric  CHEST/LUNG: Clear to percussion bilaterally; No rales, rhonchi, wheezing, or rubs  HEART: Regular rate and rhythm; No murmurs, rubs, or gallops  ABDOMEN: Soft, Nontender, Nondistended; Bowel sounds present  EXTREMITIES:  2+ Peripheral Pulses, No clubbing, cyanosis, or edema  LYMPH: No lymphadenopathy noted  SKIN: No rashes or lesions    LAB  09-11 @ 09:38  amylase --   lipase 61         CBC:  09-12 @ 07:18  WBC 9.17   Hgb 14.4   Hct 43.5   Plts 179  MCV 86.7  09-11 @ 09:38  WBC 12.07   Hgb 15.7   Hct 44.6   Plts 215  MCV 83.2      Chemistry:  09-12 @ 07:18  Na+ 133  K+ 4.2  Cl- 101  CO2 27  BUN 25  Cr 1.27     09-11 @ 09:38  Na+ 134  K+ 4.1  Cl- 100  CO2 21  BUN 28  Cr 1.55         Glucose, Serum: 165 mg/dL (09-12 @ 07:18)  Glucose, Serum: 229 mg/dL (09-11 @ 09:38)      12 Sep 2021 07:18    133    |  101    |  25     ----------------------------<  165    4.2     |  27     |  1.27   11 Sep 2021 09:38    134    |  100    |  28     ----------------------------<  229    4.1     |  21     |  1.55     Ca    8.9        12 Sep 2021 07:18  Ca    10.0       11 Sep 2021 09:38    TPro  8.8    /  Alb  4.2    /  TBili  1.5    /  DBili  x      /  AST  18     /  ALT  20     /  AlkPhos  85     11 Sep 2021 09:38              CAPILLARY BLOOD GLUCOSE      POCT Blood Glucose.: 163 mg/dL (14 Sep 2021 11:17)  POCT Blood Glucose.: 187 mg/dL (14 Sep 2021 07:26)  POCT Blood Glucose.: 196 mg/dL (13 Sep 2021 21:27)  POCT Blood Glucose.: 225 mg/dL (13 Sep 2021 15:56)          RADIOLOGY & ADDITIONAL TESTS:    Imaging Personally Reviewed:  [ ] YES  [ ] NO    Consultant(s) Notes Reviewed:  [ ] YES  [ ] NO    Care Discussed with Consultants/Other Providers [ ] YES  [ ] NO Patient is a 41y old  Male who presents with a chief complaint of Nausea and vomiting (14 Sep 2021 12:30)      HPI:  42 yo male PMH DM presents with epigastric pain and N/V since yesterday. Pt reports this happens to him about every 90 days and had endoscopy but does not know what they found. Last EGD was done one year ago,not sure of Doctor's name. Not on any meds except for DM.  Admits to marijuana use, but denies daily use. CT abdomen entirely normal.  (11 Sep 2021 18:44)      INTERVAL HPI/OVERNIGHT EVENTS:  N/V, abdominal pain better but still present. Had refused IV fluids. Just finished HIDA scan with CCK ( Vomited right after test )       MEDICATIONS  (STANDING):  dextrose 5% + sodium chloride 0.45%. 1000 milliLiter(s) (125 mL/Hr) IV Continuous <Continuous>  metoclopramide Injectable 10 milliGRAM(s) IV Push three times a day  pantoprazole    Tablet 40 milliGRAM(s) Oral before breakfast    MEDICATIONS  (PRN):  metoclopramide Injectable 10 milliGRAM(s) IV Push three times a day PRN Nausea / vomiting  morphine  - Injectable 2 milliGRAM(s) IV Push every 4 hours PRN Moderate Pain (4 - 6)  ondansetron Injectable 4 milliGRAM(s) IV Push every 6 hours PRN Nausea and/or Vomiting      FAMILY HISTORY:      Allergies    No Known Allergies    Intolerances        PMH/PSH:        REVIEW OF SYSTEMS:  CONSTITUTIONAL: No fever, weight loss, or fatigue  EYES: No eye pain, visual disturbances, or discharge  ENMT:  No difficulty hearing, tinnitus, vertigo; No sinus or throat pain  NECK: No pain or stiffness  BREASTS: No pain, masses, or nipple discharge  RESPIRATORY: No cough, wheezing, chills or hemoptysis; No shortness of breath  CARDIOVASCULAR: No chest pain, palpitations, dizziness, or leg swelling  GASTROINTESTINAL:  see above   GENITOURINARY: No dysuria, frequency, hematuria, or incontinence  NEUROLOGICAL: No headaches, memory loss, loss of strength, numbness, or tremors  SKIN: No itching, burning, rashes, or lesions   LYMPH NODES: No enlarged glands  ENDOCRINE: No heat or cold intolerance; No hair loss  MUSCULOSKELETAL: No joint pain or swelling; No muscle, back, or extremity pain  PSYCHIATRIC: No depression, anxiety, mood swings, or difficulty sleeping  HEME/LYMPH: No easy bruising, or bleeding gums  ALLERGY AND IMMUNOLOGIC: No hives or eczema    Vital Signs Last 24 Hrs  T(C): 36.3 (14 Sep 2021 08:05), Max: 36.7 (13 Sep 2021 17:12)  T(F): 97.3 (14 Sep 2021 08:05), Max: 98 (13 Sep 2021 17:12)  HR: 56 (14 Sep 2021 08:05) (56 - 64)  BP: 136/85 (14 Sep 2021 08:05) (124/63 - 136/85)  BP(mean): --  RR: 16 (14 Sep 2021 08:05) (16 - 17)  SpO2: 98% (14 Sep 2021 08:05) (98% - 98%)    PHYSICAL EXAM:  GENERAL: NAD, well-groomed, well-developed  HEAD:  Atraumatic, Normocephalic  EYES: EOMI, PERRLA, conjunctiva and sclera clear  NECK: Supple, No JVD, Normal thyroid  NERVOUS SYSTEM:  Alert & Oriented X3, Good concentration; Motor Strength 5/5 B/L upper and lower extremities;  CHEST/LUNG: Clear to percussion bilaterally; No rales, rhonchi, wheezing, or rubs  HEART: Regular rate and rhythm; No murmurs, rubs, or gallops  ABDOMEN: Soft, Nontender, Nondistended; Bowel sounds present  EXTREMITIES:  2+ Peripheral Pulses, No clubbing, cyanosis, or edema  LYMPH: No lymphadenopathy noted  SKIN: No rashes or lesions    LAB  09-11 @ 09:38  amylase --   lipase 61         CBC:  09-12 @ 07:18  WBC 9.17   Hgb 14.4   Hct 43.5   Plts 179  MCV 86.7  09-11 @ 09:38  WBC 12.07   Hgb 15.7   Hct 44.6   Plts 215  MCV 83.2      Chemistry:  09-12 @ 07:18  Na+ 133  K+ 4.2  Cl- 101  CO2 27  BUN 25  Cr 1.27     09-11 @ 09:38  Na+ 134  K+ 4.1  Cl- 100  CO2 21  BUN 28  Cr 1.55         Glucose, Serum: 165 mg/dL (09-12 @ 07:18)  Glucose, Serum: 229 mg/dL (09-11 @ 09:38)      12 Sep 2021 07:18    133    |  101    |  25     ----------------------------<  165    4.2     |  27     |  1.27   11 Sep 2021 09:38    134    |  100    |  28     ----------------------------<  229    4.1     |  21     |  1.55     Ca    8.9        12 Sep 2021 07:18  Ca    10.0       11 Sep 2021 09:38    TPro  8.8    /  Alb  4.2    /  TBili  1.5    /  DBili  x      /  AST  18     /  ALT  20     /  AlkPhos  85     11 Sep 2021 09:38              CAPILLARY BLOOD GLUCOSE      POCT Blood Glucose.: 163 mg/dL (14 Sep 2021 11:17)  POCT Blood Glucose.: 187 mg/dL (14 Sep 2021 07:26)  POCT Blood Glucose.: 196 mg/dL (13 Sep 2021 21:27)  POCT Blood Glucose.: 225 mg/dL (13 Sep 2021 15:56)          RADIOLOGY & ADDITIONAL TESTS:    Imaging Personally Reviewed:  [ ] YES  [ ] NO    Consultant(s) Notes Reviewed:  [ ] YES  [ ] NO    Care Discussed with Consultants/Other Providers [ ] YES  [ ] NO

## 2021-09-14 NOTE — PROGRESS NOTE ADULT - SUBJECTIVE AND OBJECTIVE BOX
Patient is a 41y old  Male who presents with a chief complaint of Nausea and vomiting (13 Sep 2021 15:48)    INTERVAL HPI/OVERNIGHT EVENTS: no events     MEDICATIONS  (STANDING):  lactated ringers. 1000 milliLiter(s) (125 mL/Hr) IV Continuous <Continuous>  pantoprazole    Tablet 40 milliGRAM(s) Oral before breakfast    MEDICATIONS  (PRN):  metoclopramide Injectable 10 milliGRAM(s) IV Push three times a day PRN Nausea / vomiting  morphine  - Injectable 2 milliGRAM(s) IV Push every 4 hours PRN Moderate Pain (4 - 6)  ondansetron Injectable 4 milliGRAM(s) IV Push every 6 hours PRN Nausea and/or Vomiting    Allergies    No Known Allergies    Intolerances      REVIEW OF SYSTEMS:  All other systems reviewed and are negative    Vital Signs Last 24 Hrs  T(C): 36.3 (14 Sep 2021 08:05), Max: 36.7 (13 Sep 2021 17:12)  T(F): 97.3 (14 Sep 2021 08:05), Max: 98 (13 Sep 2021 17:12)  HR: 56 (14 Sep 2021 08:05) (56 - 64)  BP: 136/85 (14 Sep 2021 08:05) (124/63 - 136/85)  BP(mean): --  RR: 16 (14 Sep 2021 08:05) (16 - 17)  SpO2: 98% (14 Sep 2021 08:05) (98% - 98%)  Daily     Daily   I&O's Summary    13 Sep 2021 07:01  -  14 Sep 2021 07:00  --------------------------------------------------------  IN: 550 mL / OUT: 0 mL / NET: 550 mL      CAPILLARY BLOOD GLUCOSE      POCT Blood Glucose.: 163 mg/dL (14 Sep 2021 11:17)  POCT Blood Glucose.: 187 mg/dL (14 Sep 2021 07:26)  POCT Blood Glucose.: 196 mg/dL (13 Sep 2021 21:27)  POCT Blood Glucose.: 225 mg/dL (13 Sep 2021 15:56)    PHYSICAL EXAM:  GENERAL: NAD,    HEAD:  Atraumatic, Normocephalic  EYES: EOMI, PERRLA, conjunctiva and sclera clear  ENMT: No tonsillar erythema, exudates, or enlargement; Moist mucous membranes, Good dentition, No lesions  NECK: Supple, No JVD, Normal thyroid  NERVOUS SYSTEM:  Alert & Oriented X3, Good concentration; Motor Strength 5/5 B/L upper and lower extremities; DTRs 2+ intact and symmetric  CHEST/LUNG: Clear to percussion bilaterally; No rales, rhonchi, wheezing, or rubs  HEART: Regular rate and rhythm; No murmurs, rubs, or gallops  ABDOMEN: Soft, Nontender, Nondistended; Bowel sounds present  EXTREMITIES:  2+ Peripheral Pulses, No clubbing, cyanosis, or edema  LYMPH: No lymphadenopathy noted  SKIN: No rashes or lesions    Labs                      Culture - Urine (collected 12 Sep 2021 10:59)  Source: Clean Catch Clean Catch (Midstream)  Final Report (13 Sep 2021 10:09):    <10,000 CFU/mL Normal Urogenital Celina                DVT prophylaxis: > Lovenox 40mg SQ daily  > Heparin   > SCD's

## 2021-09-15 VITALS — WEIGHT: 299.83 LBS

## 2021-09-15 LAB
ALBUMIN SERPL ELPH-MCNC: 3.5 G/DL — SIGNIFICANT CHANGE UP (ref 3.3–5)
ALP SERPL-CCNC: 69 U/L — SIGNIFICANT CHANGE UP (ref 40–120)
ALT FLD-CCNC: 21 U/L — SIGNIFICANT CHANGE UP (ref 12–78)
ANION GAP SERPL CALC-SCNC: 6 MMOL/L — SIGNIFICANT CHANGE UP (ref 5–17)
AST SERPL-CCNC: 12 U/L — LOW (ref 15–37)
BILIRUB SERPL-MCNC: 1.1 MG/DL — SIGNIFICANT CHANGE UP (ref 0.2–1.2)
BUN SERPL-MCNC: 12 MG/DL — SIGNIFICANT CHANGE UP (ref 7–23)
CALCIUM SERPL-MCNC: 8.9 MG/DL — SIGNIFICANT CHANGE UP (ref 8.5–10.1)
CHLORIDE SERPL-SCNC: 104 MMOL/L — SIGNIFICANT CHANGE UP (ref 96–108)
CO2 SERPL-SCNC: 26 MMOL/L — SIGNIFICANT CHANGE UP (ref 22–31)
CREAT SERPL-MCNC: 1.1 MG/DL — SIGNIFICANT CHANGE UP (ref 0.5–1.3)
GLUCOSE BLDC GLUCOMTR-MCNC: 200 MG/DL — HIGH (ref 70–99)
GLUCOSE BLDC GLUCOMTR-MCNC: 274 MG/DL — HIGH (ref 70–99)
GLUCOSE SERPL-MCNC: 229 MG/DL — HIGH (ref 70–99)
HCT VFR BLD CALC: 46.7 % — SIGNIFICANT CHANGE UP (ref 39–50)
HGB BLD-MCNC: 15.7 G/DL — SIGNIFICANT CHANGE UP (ref 13–17)
MAGNESIUM SERPL-MCNC: 2.1 MG/DL — SIGNIFICANT CHANGE UP (ref 1.6–2.6)
MCHC RBC-ENTMCNC: 28.9 PG — SIGNIFICANT CHANGE UP (ref 27–34)
MCHC RBC-ENTMCNC: 33.6 GM/DL — SIGNIFICANT CHANGE UP (ref 32–36)
MCV RBC AUTO: 86 FL — SIGNIFICANT CHANGE UP (ref 80–100)
NRBC # BLD: 0 /100 WBCS — SIGNIFICANT CHANGE UP (ref 0–0)
PHOSPHATE SERPL-MCNC: 2.7 MG/DL — SIGNIFICANT CHANGE UP (ref 2.5–4.5)
PLATELET # BLD AUTO: 179 K/UL — SIGNIFICANT CHANGE UP (ref 150–400)
POTASSIUM SERPL-MCNC: 4.1 MMOL/L — SIGNIFICANT CHANGE UP (ref 3.5–5.3)
POTASSIUM SERPL-SCNC: 4.1 MMOL/L — SIGNIFICANT CHANGE UP (ref 3.5–5.3)
PROT SERPL-MCNC: 7.7 GM/DL — SIGNIFICANT CHANGE UP (ref 6–8.3)
RBC # BLD: 5.43 M/UL — SIGNIFICANT CHANGE UP (ref 4.2–5.8)
RBC # FLD: 13 % — SIGNIFICANT CHANGE UP (ref 10.3–14.5)
SODIUM SERPL-SCNC: 136 MMOL/L — SIGNIFICANT CHANGE UP (ref 135–145)
WBC # BLD: 5.7 K/UL — SIGNIFICANT CHANGE UP (ref 3.8–10.5)
WBC # FLD AUTO: 5.7 K/UL — SIGNIFICANT CHANGE UP (ref 3.8–10.5)

## 2021-09-15 PROCEDURE — 99239 HOSP IP/OBS DSCHRG MGMT >30: CPT

## 2021-09-15 RX ORDER — METOCLOPRAMIDE HCL 10 MG
10 TABLET ORAL THREE TIMES A DAY
Refills: 0 | Status: DISCONTINUED | OUTPATIENT
Start: 2021-09-15 | End: 2021-09-15

## 2021-09-15 RX ORDER — PANTOPRAZOLE SODIUM 20 MG/1
1 TABLET, DELAYED RELEASE ORAL
Qty: 60 | Refills: 0
Start: 2021-09-15 | End: 2021-10-14

## 2021-09-15 RX ORDER — METOCLOPRAMIDE HCL 10 MG
1 TABLET ORAL
Qty: 56 | Refills: 0
Start: 2021-09-15 | End: 2021-09-28

## 2021-09-15 RX ORDER — METFORMIN HYDROCHLORIDE 850 MG/1
1 TABLET ORAL
Qty: 60 | Refills: 0
Start: 2021-09-15 | End: 2021-10-14

## 2021-09-15 RX ADMIN — MORPHINE SULFATE 2 MILLIGRAM(S): 50 CAPSULE, EXTENDED RELEASE ORAL at 07:21

## 2021-09-15 RX ADMIN — Medication 10 MILLIGRAM(S): at 15:22

## 2021-09-15 RX ADMIN — MORPHINE SULFATE 2 MILLIGRAM(S): 50 CAPSULE, EXTENDED RELEASE ORAL at 07:06

## 2021-09-15 RX ADMIN — MORPHINE SULFATE 2 MILLIGRAM(S): 50 CAPSULE, EXTENDED RELEASE ORAL at 01:44

## 2021-09-15 RX ADMIN — PANTOPRAZOLE SODIUM 40 MILLIGRAM(S): 20 TABLET, DELAYED RELEASE ORAL at 07:57

## 2021-09-15 NOTE — DISCHARGE NOTE PROVIDER - NSDCFUADDAPPT_GEN_ALL_CORE_FT
It is important to see your primary physician as well as other necessary consultants within the next week to perform a comprehensive medical review.  Call their offices for an appointment as soon as you leave the hospital.  If you do not have a primary physician or cant reach him/her, contact the [facility] Physician Referral Service at [phone].  Your medical issues appear to be stable at this time, but if your symptoms recur or worsen, contact your physicians and/or return to the hospital if necessary.  If you encounter any issues or questions with your medication, call your physicians before stopping the medication.

## 2021-09-15 NOTE — DISCHARGE NOTE PROVIDER - CARE PROVIDER_API CALL
[place] [address]  Phone: [phone]  Fax: [phone]  Follow Up Time: 1 week    your primary care doctor,   Phone: (   )    -  Fax: (   )    -  Follow Up Time: 1-3 days

## 2021-09-15 NOTE — DIETITIAN INITIAL EVALUATION ADULT. - PERTINENT LABORATORY DATA
09-15 Na136 mmol/L Glu 229 mg/dL<H> K+ 4.1 mmol/L Cr  1.10 mg/dL BUN 12 mg/dL 09-15 Phos 2.7 mg/dL 09-15 Alb 3.5 g/dL  09-14 Finger Sticks 163-187 mg/dL  09-12 HbA1c 11.3%

## 2021-09-15 NOTE — DIETITIAN INITIAL EVALUATION ADULT. - OTHER INFO
Pt reports good appetite and PO intake this morning for breakfast (pt was advanced from clears this morning 09/15). Per flow sheets pt consuming 100% of documented meals during LOS. Denies difficulty chewing and states he sometimes feels like something is stuck in his throat- floor PA aware. Pt denies nausea, vomiting, diarrhea, or constipation. States weight loss x 2 years, mostly intentional as he had started working out and eating better PTA. States  pounds.  Pt with T2DM; states he takes Metformin at home and he checks his blood glucose levels daily with ranges 100-200 mg/dL. HbA1c 11.3% indicates poor blood glucose management.  Pt amenable to Gastroparesis nutrition therapy and Meal Planning with the Plate Method nutrition therapy. Discussed low fat and low fiber diet with examples of foods to eat and avoid, small frequent meals, soft foods. Discussed foods containing carbohydrates with portion sizes, pairing carbohydrates with proteins (with examples), consistent meal patterns, and portion control. Pt made aware RD remains available.

## 2021-09-15 NOTE — DIETITIAN INITIAL EVALUATION ADULT. - EDUCATION DIETARY MODIFICATIONS
Gastroparesis nutrition therapy and Meal Planning with the Plate Method nutrition therapy. Discussed low fat and low fiber diet with examples of foods to eat and avoid, small frequent meals, soft foods. Discussed foods containing carbohydrates with portion sizes, pairing carbohydrates with proteins (with examples), consistent meal patterns, and portion control/(1) partially meets; needs review/practice

## 2021-09-15 NOTE — DIETITIAN INITIAL EVALUATION ADULT. - ORAL INTAKE PTA/DIET HISTORY
Pt reports fair PO intake PTA. States since his "stomach problems" began 1 year ago his appetite and intake fluctuate daily. Per diet recall pt eats wide variety of foods PTA and pt states he consumes lots of rice.

## 2021-09-15 NOTE — PROGRESS NOTE ADULT - SUBJECTIVE AND OBJECTIVE BOX
Patient is a 41y old  Male who presents with a chief complaint of Nausea and vomiting (14 Sep 2021 15:43)      HPI:  42 yo male PMH DM presents with epigastric pain and N/V since yesterday. Pt reports this happens to him about every 90 days and had endoscopy but does not know what they found. Last EGD was done one year ago,not sure of Doctor's name. Not on any meds except for DM.  Admits to marijuana use, but denies daily use. CT abdomen entirely normal.  (11 Sep 2021 18:44)      INTERVAL HPI/OVERNIGHT EVENTS: ( Not understanding each other, 4 bottles )  No further N/V, now only has bubbling around the abdomen after taking clears. Abdominal pain ( diffuse constant, crampy not related to meals  ) better, Insists on solid diet.  Normal HIDA w/ CCK    MEDICATIONS  (STANDING):  dextrose 5% + sodium chloride 0.45%. 1000 milliLiter(s) (125 mL/Hr) IV Continuous <Continuous>  metoclopramide Injectable 10 milliGRAM(s) IV Push three times a day  pantoprazole    Tablet 40 milliGRAM(s) Oral before breakfast    MEDICATIONS  (PRN):  ondansetron Injectable 4 milliGRAM(s) IV Push every 6 hours PRN Nausea and/or Vomiting      FAMILY HISTORY:      Allergies    No Known Allergies    Intolerances        PMH/PSH:        REVIEW OF SYSTEMS:  CONSTITUTIONAL: No fever, weight loss,  EYES: No eye pain, visual disturbances, or discharge  ENMT:  No difficulty hearing, tinnitus, vertigo; No sinus or throat pain  NECK: No pain or stiffness  BREASTS: No pain, masses, or nipple discharge  RESPIRATORY: No cough, wheezing, chills or hemoptysis; No shortness of breath  CARDIOVASCULAR: No chest pain, palpitations, dizziness, or leg swelling  GASTROINTESTINAL: See above   GENITOURINARY: No dysuria, frequency, hematuria, or incontinence  NEUROLOGICAL: No headaches, memory loss, loss of strength, numbness, or tremors  SKIN: No itching, burning, rashes, or lesions   LYMPH NODES: No enlarged glands  ENDOCRINE: No heat or cold intolerance; No hair loss  MUSCULOSKELETAL: No joint pain or swelling; No muscle, back, or extremity pain  PSYCHIATRIC: No depression, anxiety, mood swings, or difficulty sleeping  HEME/LYMPH: No easy bruising, or bleeding gums  ALLERGY AND IMMUNOLOGIC: No hives or eczema    Vital Signs Last 24 Hrs  T(C): --  T(F): --  HR: 59 (15 Sep 2021 06:20) (59 - 64)  BP: 139/91 (15 Sep 2021 06:20) (139/91 - 161/97)  BP(mean): --  RR: 18 (15 Sep 2021 06:20) (16 - 18)  SpO2: 98% (14 Sep 2021 16:07) (98% - 98%)    PHYSICAL EXAM:  GENERAL: NAD, well-groomed, well-developed  HEAD:  Atraumatic, Normocephalic  EYES: EOMI, PERRLA, conjunctiva and sclera clear  NECK: Supple, No JVD, Normal thyroid  NERVOUS SYSTEM:  Alert & Oriented X3, Good concentration; Motor Strength 5/5 B/L upper and lower extremities;   CHEST/LUNG: Clear to percussion bilaterally; No rales, rhonchi, wheezing, or rubs  HEART: Regular rate and rhythm; No murmurs, rubs, or gallops  ABDOMEN: Soft, Nontender, Nondistended; Bowel sounds present  EXTREMITIES:  2+ Peripheral Pulses, No clubbing, cyanosis, or edema  LYMPH: No lymphadenopathy noted  SKIN: No rashes or lesions    LAB  09-11 @ 09:38  amylase --   lipase 61         CBC:  09-12 @ 07:18  WBC 9.17   Hgb 14.4   Hct 43.5   Plts 179  MCV 86.7  09-11 @ 09:38  WBC 12.07   Hgb 15.7   Hct 44.6   Plts 215  MCV 83.2      Chemistry:  09-12 @ 07:18  Na+ 133  K+ 4.2  Cl- 101  CO2 27  BUN 25  Cr 1.27     09-11 @ 09:38  Na+ 134  K+ 4.1  Cl- 100  CO2 21  BUN 28  Cr 1.55         Glucose, Serum: 165 mg/dL (09-12 @ 07:18)  Glucose, Serum: 229 mg/dL (09-11 @ 09:38)      12 Sep 2021 07:18    133    |  101    |  25     ----------------------------<  165    4.2     |  27     |  1.27   11 Sep 2021 09:38    134    |  100    |  28     ----------------------------<  229    4.1     |  21     |  1.55     Ca    8.9        12 Sep 2021 07:18  Ca    10.0       11 Sep 2021 09:38    TPro  8.8    /  Alb  4.2    /  TBili  1.5    /  DBili  x      /  AST  18     /  ALT  20     /  AlkPhos  85     11 Sep 2021 09:38              CAPILLARY BLOOD GLUCOSE      POCT Blood Glucose.: 200 mg/dL (15 Sep 2021 07:31)  POCT Blood Glucose.: 186 mg/dL (14 Sep 2021 16:53)  POCT Blood Glucose.: 163 mg/dL (14 Sep 2021 11:17)          RADIOLOGY & ADDITIONAL TESTS:      < from: NM Hepatobiliary Imaging w/ RX (09.14.21 @ 15:49) >    EXAM:  NM HEPATOBILIARY IMG W RX                            PROCEDURE DATE:  09/14/2021          INTERPRETATION:  RADIOPHARMACEUTICAL: 3 mCi Tc-99m-Mebrofenin, I.V.    CLINICAL INFORMATION: 41 year-old male with epigastric pain, nausea and vomiting; referred to evaluate for biliary dyskinesia    TECHNIQUE: Dynamic images of the anterior abdomen were obtained for approximately 60 minutes after the injection of the above dose of radiotracer followed by dynamic imaging of the anterior abdomen for 65 minutes.  Five minutes after beginning dynamic imaging, Sincalide 1.8 ug diluted in 50 cc normal saline I.V. was infused intravenously over 60 minutes.    COMPARISON: No prior hepatobiliary scan is available for comparison.    FINDINGS: There is prompt prompt tracer uptake by the hepatocytes. The gallbladder is visualized starting at 15 minutes after tracer injection and bowel activity by 20 minutes. There is prompt emptying of the gallbladder following the dose of sincalide. The calculated gallbladder ejection fraction is 80% (normal is greater than 38%). There is good clearance of activity from the liver by the end of the study.    IMPRESSION: Normal quantitative hepatobiliary study.    Normal gallbladder ejection fraction of 80 %.    No scan evidence of biliary dyskinesia.          --- End of Report ---            ELISE GOYAL MD; Attending Nuclear Medicine  This document has been electronically signed. Sep 14 2021  3:54PM    < end of copied text >  Imaging Personally Reviewed:  [ ] YES  [ ] NO    Consultant(s) Notes Reviewed:  [ ] YES  [ ] NO    Care Discussed with Consultants/Other Providers [ ] YES  [ ] NO

## 2021-09-15 NOTE — DISCHARGE NOTE PROVIDER - HOSPITAL COURSE
40 yo male PMH DM2 presents with epigastric pain and N/V. Pt reports this happens to him about every 90 days and had endoscopy but does not know what they found. Last EGD was done one year ago,not sure of Doctor's name. Not on any meds except for DM.  Admits to marijuana use, but denies daily use. CT abdomen entirely normal.     This is my first day with the patient. When I entered the room, he was on his phone watching "[employer]" loud. I introduced myself to patient as Hi, I'm Dr. Eng, he replied, what do you want Ms.? I said that respectfully, I'd like to be addressed as Doctor. He replied, "you are a lady and I can call you whatever I want." Patient refused to lower the volume of his phone. When asked if he ate his food, he said yes. He stated he did not have nausea or vomiting. stated his pain is controlled. Did not appear like he wanted to speak with me, answering in 1-2 words and appeared annoyed with me.  I asked if I can examine him and he started becoming belligerent, yelling that he is in pain and waiting for pain medicine. Patient refused physical exam. I stated that I will be discharging him as labs and imaging are OK and he is able to follow-up outpatient with his PMD and GI. He started yelling and screaming that he is vomiting and in pain. Followed me outside the room asking for my name, threatening me and saying "I'll morgan you if you discharge me!!"     spoke with CNA and RN, stated he ate without difficulty. Only asked for pain meds this AM and hasn't been in pain since. per staff,  Stated no vomiting. Per CNA, patient was doing jumping jacks in the room and stated he feels wonderful. Has been belligerent and rude to staff.  often refusing meds and care. Patient is AOx4 and has capacity to refuse care.   Given normal labs and electrolytes, patient is NOT vomiting and also per staff has not vomited since yesterday. refused reglan several times. refused insulin this entire admission. refused IVF, as he is drinking plenty of PO fluids   Told dietician he has trouble swallowing, however per staff he ate 100% of his meal and had no issues chewing or swallowing. No noted coughing or otherwise concerns per staff .   drinking fluids at bedside.   tolerate regular food (regular food was requested by him) (order for soft, but patient demanded regular food which was provided to him)   demanded to shower today.   Vitals stable.       EKG: poor study. NSR.   patient denies CP/palpitations/sob       DISCHARGE DIAGNOSES:   Presumed gastroparesis , THC as cause vs diabetic gastroparesis  lipase OK   CTAP C+: no sig findings   HIDA w/ CCK neg   advised to follow-up with gastroenterology for outpatient gastroemptying study , patient expressed understanding   reglan PO   PPI     DM2, out of control  patient refuses insulin   A1c 11.3  follow-up outpatient   on metformin   education on importance of diet, and diabetes/BG control, and PMD/endocrinology follow-up provided   patient stated he knows what he is doing and manages his diabetes     Initial leukocytosis, BENEDICT POA in setting of dehydration/intravascular contraction , further proven by + ketones in urine   sec to vomiting and poor PO intake prior to admission   s/p IVF   WBC normal. afebrile. no e/o infection   Cr normalized   Pt denies urinary complaints     pseudohyponatremia, in setting of hyperglycemia ; now normalized Na     microscoping hematuria , recommend repeat UA outpatient , patient agreeable   UCx neg     THC abuse   Utox: +THC   I strongly encouraged the patient to quit smoking THC.  I outlined the extensive negative impact of THC use and the numerous consequences of continued smoking . The patient stated understanding.     unvaccinated for COVID, and refuses vaccine     Discharge time : 40 min     RETURN PARAMETERS DISCUSSED WITH PATIENT, PATIENT EXPRESSED UNDERSTANDING AND IS AGREEABLE. DISCUSSED WITH PATIENT ON REFRAINING FROM DRIVING UNTIL FOLLOW-UP/ CLEARED BY PMD. PATIENT EXPRESSED UNDERSTANDING.

## 2021-09-15 NOTE — DISCHARGE NOTE NURSING/CASE MANAGEMENT/SOCIAL WORK - PATIENT PORTAL LINK FT
You can access the FollowMyHealth Patient Portal offered by [facility] by registering at the following website: [email or web address]. By joining [email or web address]’s FollowMyHealth portal, you will also be able to view your health information using other applications (apps) compatible with our system.

## 2021-09-15 NOTE — DIETITIAN INITIAL EVALUATION ADULT. - PERTINENT MEDS FT
MEDICATIONS  (STANDING):  dextrose 5% + sodium chloride 0.45%. 1000 milliLiter(s) (125 mL/Hr) IV Continuous <Continuous>  metoclopramide 10 milliGRAM(s) Oral three times a day  metoclopramide Injectable 10 milliGRAM(s) IV Push three times a day  pantoprazole    Tablet 40 milliGRAM(s) Oral before breakfast    MEDICATIONS  (PRN):  ondansetron Injectable 4 milliGRAM(s) IV Push every 6 hours PRN Nausea and/or Vomiting

## 2021-09-15 NOTE — DISCHARGE NOTE PROVIDER - PROVIDER TOKENS
PROVIDER:[TOKEN:[[id number]],FOLLOWUP:[1 week]],FREE:[LAST:[your primary care doctor],PHONE:[(   )    -],FAX:[(   )    -],FOLLOWUP:[1-3 days]]

## 2021-09-15 NOTE — DIETITIAN INITIAL EVALUATION ADULT. - OTHER CALCULATIONS
Ht (cm): 185.4cm   Wt (kg): 88kg (dosing weight 09/11)   BMI: 25.6     IBW: 83.4kg +/- 10% %IBW: 106% UBW: 136kg %UBW: 65%

## 2021-09-15 NOTE — DISCHARGE NOTE PROVIDER - NSDCCPCAREPLAN_GEN_ALL_CORE_FT
PRINCIPAL DISCHARGE DIAGNOSIS  Diagnosis: Gastroparesis  Assessment and Plan of Treatment:       SECONDARY DISCHARGE DIAGNOSES  Diagnosis: Intractable abdominal pain  Assessment and Plan of Treatment:     Diagnosis: Diabetes  Assessment and Plan of Treatment:     Diagnosis: Tetrahydrocannabinol (THC) dependence  Assessment and Plan of Treatment:

## 2021-09-15 NOTE — DIETITIAN INITIAL EVALUATION ADULT. - ADD RECOMMEND
Monitor and replete electrolytes as needed 1. Monitor and replete electrolytes as needed   2. Endocrine consult

## 2021-09-15 NOTE — DISCHARGE NOTE PROVIDER - NSDCMRMEDTOKEN_GEN_ALL_CORE_FT
metFORMIN 1000 mg oral tablet: 1 tab(s) orally 2 times a day   pantoprazole 40 mg oral delayed release tablet: 1 tab(s) orally every 12 hours   Reglan 10 mg oral tablet: 1 tab(s) orally 4 times a day (before meals and at bedtime)

## 2021-09-15 NOTE — PROGRESS NOTE ADULT - ASSESSMENT
HPI:  42 yo male PMH DM presents with epigastric pain and N/V since yesterday. Pt reports this happens to him about every 90 days and had endoscopy but does not know what they found. Last EGD was done one year ago,not sure of Doctor's name. Not on any meds except for DM.  Admits to marijuana use, but denies daily use. CT abdomen entirely normal.  (11 Sep 2021 18:44)    --- As Above -----------------  Patient states that since 09/2020, he has episodes of abdominal pain described as crampy a/w N/V. He had 4 episodes necessitating admission prior to September. He admits taking marijuana but infrequently drinks or takes NSAIDs. HGB A1c better at 11 ( seel lab ) Patient states that he had a negative EGD in september.  Feels better today    N/V, abdominal pain- R/O gastroparesis, secondary to marijuana use, GB disease, etc. -BETTER, Normal HIDA w/ CCK   1) Advance diet to soft, lactose free diet 2) IV fluid ( Patient now agreeing to IV ( Discussed for 10 minutes ) 3) Decrease pain medications ( Patient knows that the diet will be changed to liquids if pain worsens  (if pain medications can not be decreased ) )  4) will hold off on EGD for now 5) Reglan Q 8 H PO and to taper gradually  ==== Discussed with patient ~ 15 minutes

## 2021-09-22 DIAGNOSIS — R11.2 NAUSEA WITH VOMITING, UNSPECIFIED: ICD-10-CM

## 2021-09-22 DIAGNOSIS — T40.7X1A POISONING BY CANNABIS (DERIVATIVES), ACCIDENTAL (UNINTENTIONAL), INITIAL ENCOUNTER: ICD-10-CM

## 2021-09-22 DIAGNOSIS — N17.9 ACUTE KIDNEY FAILURE, UNSPECIFIED: ICD-10-CM

## 2021-09-22 DIAGNOSIS — Z20.822 CONTACT WITH AND (SUSPECTED) EXPOSURE TO COVID-19: ICD-10-CM
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
## 2021-09-22 DIAGNOSIS — E86.0 DEHYDRATION: ICD-10-CM

## 2021-09-22 DIAGNOSIS — E11.43 TYPE 2 DIABETES MELLITUS WITH DIABETIC AUTONOMIC (POLY)NEUROPATHY: ICD-10-CM

## 2021-09-22 DIAGNOSIS — F12.20 CANNABIS DEPENDENCE, UNCOMPLICATED: ICD-10-CM

## 2021-09-22 DIAGNOSIS — K29.70 GASTRITIS, UNSPECIFIED, WITHOUT BLEEDING: ICD-10-CM

## 2021-09-22 DIAGNOSIS — R31.21 ASYMPTOMATIC MICROSCOPIC HEMATURIA: ICD-10-CM

## 2021-09-22 DIAGNOSIS — K31.84 GASTROPARESIS: ICD-10-CM

## 2021-09-22 DIAGNOSIS — E11.65 TYPE 2 DIABETES MELLITUS WITH HYPERGLYCEMIA: ICD-10-CM

## 2021-12-12 NOTE — DIETITIAN INITIAL EVALUATION ADULT. - DIET TYPE
Clear bilaterally, pupils equal, round and reactive to light. No lactose restricted/soft/fiber/residue restricted/low fat

## 2024-06-26 NOTE — PATIENT PROFILE ADULT - NSPROMEDSADMININFO_GEN_A_NUR
Caller: MELITON VASQUEZ    Relationship to patient: Mother    Best call back number: [phone]     Chief complaint: FOLLOW UP FROM [facility]RE[facility]    Type of visit: OFFICE    Requested date: FIRST WEEK OF JULY       Additional notes:WAS TOLD HE NEEDED TO FOLLOW UP TO MAKE SURE STREP IS GONE     NO AVAILABLE APPOINTMENTS UNTIL AUGUST WITH Meliton Boss APRN    PLEASE ADVISE      
no concerns